# Patient Record
Sex: FEMALE
[De-identification: names, ages, dates, MRNs, and addresses within clinical notes are randomized per-mention and may not be internally consistent; named-entity substitution may affect disease eponyms.]

---

## 2020-02-12 ENCOUNTER — APPOINTMENT (OUTPATIENT)
Dept: UROLOGY | Facility: CLINIC | Age: 55
End: 2020-02-12
Payer: COMMERCIAL

## 2020-02-12 VITALS
WEIGHT: 116 LBS | TEMPERATURE: 97.6 F | HEIGHT: 62 IN | HEART RATE: 72 BPM | SYSTOLIC BLOOD PRESSURE: 102 MMHG | BODY MASS INDEX: 21.35 KG/M2 | DIASTOLIC BLOOD PRESSURE: 68 MMHG

## 2020-02-12 DIAGNOSIS — R31.9 HEMATURIA, UNSPECIFIED: ICD-10-CM

## 2020-02-12 DIAGNOSIS — Z00.00 ENCOUNTER FOR GENERAL ADULT MEDICAL EXAMINATION W/OUT ABNORMAL FINDINGS: ICD-10-CM

## 2020-02-12 LAB
BILIRUB UR QL STRIP: NEGATIVE
CLARITY UR: CLEAR
COLLECTION METHOD: NORMAL
GLUCOSE UR-MCNC: NEGATIVE
HCG UR QL: 0.2 EU/DL
HGB UR QL STRIP.AUTO: NORMAL
KETONES UR-MCNC: NEGATIVE
LEUKOCYTE ESTERASE UR QL STRIP: NEGATIVE
NITRITE UR QL STRIP: NEGATIVE
PH UR STRIP: 7
PROT UR STRIP-MCNC: NEGATIVE
SP GR UR STRIP: 1.02

## 2020-02-12 PROCEDURE — 81003 URINALYSIS AUTO W/O SCOPE: CPT | Mod: QW

## 2020-02-12 PROCEDURE — 99204 OFFICE O/P NEW MOD 45 MIN: CPT

## 2020-02-12 PROCEDURE — 51798 US URINE CAPACITY MEASURE: CPT

## 2020-02-13 NOTE — ASSESSMENT
[FreeTextEntry1] : Patient is a 54 year old female with microscopic hematuria.  We reviewed the necessary workup and she understands the need to undergo a cystoscopy as well as a CT urogram.   I will be in touch with an pertinent findings.

## 2020-02-13 NOTE — HISTORY OF PRESENT ILLNESS
[FreeTextEntry1] : Patient is a 54 year old  female with microscopic hematuria who presents for evaluation.  She also states she has had a discomfort in her lower abdomen since mid-December which was similar to menstrual cramps.  She was evaluated by her gynecologist and underwent a transvaginal ultrasound which was normal.  She was then evaluated by a gastroenterologist and underwent an endoscopy and colonoscopy which also were negative.  She reports baseline urinary urgency (since childhood) but denies gross hematuria, urinary incontinence and nocturia. \par \par PMH:  MI(age 44), hypothyroidism, HTN, hyperchol\par PSH: cardiac catheterization\par 1-2 cups caff, no artificial sweetener\par never smoked, social EtOH\par Fam Hx: neg stones, neg  malignancy\par sexually active, no hx STD's [Abdominal Pain] : abdominal pain

## 2020-02-13 NOTE — LETTER BODY
[Dear  ___] : Dear  [unfilled], [Consult Letter:] : I had the pleasure of evaluating your patient, [unfilled]. [Consult Closing:] : Thank you very much for allowing me to participate in the care of this patient.  If you have any questions, please do not hesitate to contact me. [Please see my note below.] : Please see my note below. [Sincerely,] : Sincerely, [FreeTextEntry3] : Dr. Sierra Tobin\par

## 2020-02-13 NOTE — PHYSICAL EXAM
[General Appearance - Well Developed] : well developed [General Appearance - Well Nourished] : well nourished [Normal Appearance] : normal appearance [Well Groomed] : well groomed [General Appearance - In No Acute Distress] : no acute distress [Edema] : no peripheral edema [Exaggerated Use Of Accessory Muscles For Inspiration] : no accessory muscle use [Abdomen Soft] : soft [Respiration, Rhythm And Depth] : normal respiratory rhythm and effort [Costovertebral Angle Tenderness] : no ~M costovertebral angle tenderness [FreeTextEntry1] : left suprapubic tenderness  [Urinary Bladder Findings] : the bladder was normal on palpation [] : no rash [Normal Station and Gait] : the gait and station were normal for the patient's age [No Focal Deficits] : no focal deficits [Mood] : the mood was normal [Oriented To Time, Place, And Person] : oriented to person, place, and time [Affect] : the affect was normal [Not Anxious] : not anxious [No Palpable Adenopathy] : no palpable adenopathy

## 2020-02-14 LAB
BACTERIA UR CULT: NORMAL
URINE CYTOLOGY: NORMAL

## 2020-03-31 ENCOUNTER — APPOINTMENT (OUTPATIENT)
Dept: UROLOGY | Facility: CLINIC | Age: 55
End: 2020-03-31